# Patient Record
Sex: FEMALE | Race: WHITE | URBAN - METROPOLITAN AREA
[De-identification: names, ages, dates, MRNs, and addresses within clinical notes are randomized per-mention and may not be internally consistent; named-entity substitution may affect disease eponyms.]

---

## 2017-02-22 ENCOUNTER — OUTPATIENT (OUTPATIENT)
Dept: OUTPATIENT SERVICES | Facility: HOSPITAL | Age: 47
LOS: 1 days | Discharge: HOME | End: 2017-02-22

## 2017-06-27 DIAGNOSIS — Z12.31 ENCOUNTER FOR SCREENING MAMMOGRAM FOR MALIGNANT NEOPLASM OF BREAST: ICD-10-CM

## 2018-06-27 ENCOUNTER — OUTPATIENT (OUTPATIENT)
Dept: OUTPATIENT SERVICES | Facility: HOSPITAL | Age: 48
LOS: 1 days | Discharge: HOME | End: 2018-06-27

## 2018-06-27 DIAGNOSIS — Z12.31 ENCOUNTER FOR SCREENING MAMMOGRAM FOR MALIGNANT NEOPLASM OF BREAST: ICD-10-CM

## 2019-08-09 ENCOUNTER — OUTPATIENT (OUTPATIENT)
Dept: ADMINISTRATIVE | Facility: HOSPITAL | Age: 49
LOS: 1 days | Discharge: HOME | End: 2019-08-09
Payer: COMMERCIAL

## 2019-08-09 PROCEDURE — 77063 BREAST TOMOSYNTHESIS BI: CPT | Mod: 26

## 2019-08-09 PROCEDURE — 77067 SCR MAMMO BI INCL CAD: CPT | Mod: 26

## 2019-08-15 DIAGNOSIS — Z12.31 ENCOUNTER FOR SCREENING MAMMOGRAM FOR MALIGNANT NEOPLASM OF BREAST: ICD-10-CM

## 2019-08-23 ENCOUNTER — OUTPATIENT (OUTPATIENT)
Dept: OUTPATIENT SERVICES | Facility: HOSPITAL | Age: 49
LOS: 1 days | Discharge: HOME | End: 2019-08-23
Payer: COMMERCIAL

## 2019-08-23 DIAGNOSIS — R92.8 OTHER ABNORMAL AND INCONCLUSIVE FINDINGS ON DIAGNOSTIC IMAGING OF BREAST: ICD-10-CM

## 2019-08-23 PROCEDURE — 76642 ULTRASOUND BREAST LIMITED: CPT | Mod: 26,LT

## 2019-08-23 PROCEDURE — 77061 BREAST TOMOSYNTHESIS UNI: CPT | Mod: 26,LT

## 2019-08-23 PROCEDURE — 77065 DX MAMMO INCL CAD UNI: CPT | Mod: 26,LT

## 2019-08-23 PROCEDURE — G0279: CPT | Mod: 26,LT

## 2019-08-26 ENCOUNTER — OUTPATIENT (OUTPATIENT)
Dept: OUTPATIENT SERVICES | Facility: HOSPITAL | Age: 49
LOS: 1 days | Discharge: HOME | End: 2019-08-26
Payer: COMMERCIAL

## 2019-08-26 ENCOUNTER — RESULT REVIEW (OUTPATIENT)
Age: 49
End: 2019-08-26

## 2019-08-26 PROCEDURE — 88305 TISSUE EXAM BY PATHOLOGIST: CPT | Mod: 26

## 2019-08-26 PROCEDURE — 77065 DX MAMMO INCL CAD UNI: CPT | Mod: 26,LT

## 2019-08-26 PROCEDURE — 19083 BX BREAST 1ST LESION US IMAG: CPT | Mod: LT

## 2019-08-29 DIAGNOSIS — N63.20 UNSPECIFIED LUMP IN THE LEFT BREAST, UNSPECIFIED QUADRANT: ICD-10-CM

## 2019-08-29 DIAGNOSIS — D24.2 BENIGN NEOPLASM OF LEFT BREAST: ICD-10-CM

## 2019-08-29 DIAGNOSIS — R92.0 MAMMOGRAPHIC MICROCALCIFICATION FOUND ON DIAGNOSTIC IMAGING OF BREAST: ICD-10-CM

## 2019-10-03 PROBLEM — Z00.00 ENCOUNTER FOR PREVENTIVE HEALTH EXAMINATION: Status: ACTIVE | Noted: 2019-10-03

## 2019-10-10 ENCOUNTER — APPOINTMENT (OUTPATIENT)
Dept: BREAST CENTER | Facility: CLINIC | Age: 49
End: 2019-10-10
Payer: COMMERCIAL

## 2019-10-10 VITALS — HEIGHT: 66 IN | BODY MASS INDEX: 38.57 KG/M2 | WEIGHT: 240 LBS

## 2019-10-10 DIAGNOSIS — Z87.891 PERSONAL HISTORY OF NICOTINE DEPENDENCE: ICD-10-CM

## 2019-10-10 DIAGNOSIS — Z86.79 PERSONAL HISTORY OF OTHER DISEASES OF THE CIRCULATORY SYSTEM: ICD-10-CM

## 2019-10-10 DIAGNOSIS — Z78.9 OTHER SPECIFIED HEALTH STATUS: ICD-10-CM

## 2019-10-10 DIAGNOSIS — Z80.3 FAMILY HISTORY OF MALIGNANT NEOPLASM OF BREAST: ICD-10-CM

## 2019-10-10 PROCEDURE — 99203 OFFICE O/P NEW LOW 30 MIN: CPT

## 2019-10-10 RX ORDER — AZILSARTAN KAMEDOXOMIL AND CHLORTHALIDONE 40; 25 MG/1; MG/1
TABLET ORAL
Refills: 0 | Status: ACTIVE | COMMUNITY

## 2019-10-10 RX ORDER — ESTRADIOL 10 UG/1
TABLET, FILM COATED VAGINAL
Refills: 0 | Status: ACTIVE | COMMUNITY

## 2019-10-16 NOTE — ASSESSMENT
[FreeTextEntry1] : Tianna is a 48 F with a left breast retroareolar intraductal papilloma. \par \par On exam, there was no suspicious lesions palpated in either breast.  Her most recent screening mammogram on 8/9/19 revealed a left retroareolar mass which, on subsequent dx imaging revealed a oval circumscribed mass in the left, retroareolar area, measuring 0.7 x 0.7 x 0.5 mm.  This was biopsy proven intraductal papilloma without atypia. \par \par Intraductal papillomas without atypia are considered fibroproliferative lesions without atypia.  Patients with these lesions were found to have a slightly increased relative risk of breast cancer compared to the reference population.  However the lesions themselves do not have any malignant potential. \par \par Although newer studies regarding the upgrade rate (to cancer) ranges from 0-14% on surgical excision, with pathologic/radiologic concordance, older studies found a higher upgrade rate.  I have recommended surgical excision for this reason.  Because it is not readily palpable, I will have her undergo a preoperative radiofrequency localization.  \par \par The risks and benefits of the procedure were explained to the patient, including bleeding, infection, seroma/hematoma formation, and possible re-operation if the surgical excision yields an upgrade to cancer with positive margins.\par \par In regards to her family history of breast cancer, her risk assessment is as follows: \par \par RISK ASSESSMENT: \par Latonia\par 5yr -- 1.6%\par lifetime -- 14.4%\par \par TC v6\par 5yr -- 4.1%\par lifetime -- 16.5%\par \par This puts her at an intermediate risk for breast cancer.  She does not quite meet criteria for screening MRIs or chemoppx at this time.  IN regards to genetic testing, both her paternal aunt and grandmother were diagnosed at a young age.  If either of them were found to have a mutation, then we can test Tianna for that mutation.  This was discussed with her and she will let us know if she would like to proceed with genetic testing.  \par \par We discussed dense breasts.  Increasing breast density has been found to increase ones risk of breast cancer, but at this time, there is no clear indication for additional imaging in this setting, as both US and MRI have not been found to improve survival.  One can consider bilateral screening US.  However, out of 1000 women screened, the use of routine US will only identify an additional 3-5 cancers.  The use of US was found to increase the likelihood of undergoing more imaging and more biopsies.  She does have dense breasts.  We have decided to proceed with screening bilateral breast US at this time.  This will be scheduled with her next screening mammogram.\par \par All of her questions were answered.  She knows to call with any further questions or concerns. \par \par PLAN: \par -OR: L WLE WITH RF LOCALIZATION \par -DIAGNOSIS: LEFT INTRADUCTAL PAPILLOMA \par -possible genetic testing \par -f/up after

## 2019-10-16 NOTE — DATA REVIEWED
[FreeTextEntry1] : EXAM: MG MAMMO SCREEN W SEGUNDO BI# \par \par \par PROCEDURE DATE: 08/09/2019 \par \par \par \par INTERPRETATION: HISTORY: \par Bilateral MG MAMMO SCREEN W SEGUNDO BI# was performed. Patient is 48 years old \par and is seen for screening. The patient has no personal history of cancer. \par The patient has the following family history of breast cancer: paternal \par grandmother, at age 50, breast cancer and paternal aunt, breast cancer. \par \par RISK ASSESSMENT: \par NCI Lifetime Risk: 12.3% \par \par CLINICAL BREAST EXAM: \par The patient reports her last clinical breast exam was performed 6 months ago. \par \par COMPARISON STUDIES: \par The present examination has been compared to prior imaging studies performed \par at [facility] on 02/19/2016, 02/22/2017 and \par 06/27/2018. \par \par MAMMOGRAM FINDINGS: \par Mammography was performed including the following views: bilateral \par craniocaudal with tomosynthesis, bilateral mediolateral oblique with \par tomosynthesis. The examination includes digital synthetic 2D and digital \par tomosynthesis 3D images. Additional imaging analysis was performed using CAD \par (computer-aided detection) software. \par \par The breasts are heterogeneously dense, which may obscure small masses. \par \par There is an apparent mass with circumscribed margins seen in the \par retroareolar of the left breast. \par \par No suspicious mass, grouping of calcifications, or other abnormality is \par identified in the right breast. \par \par IMPRESSION: \par Mass in the left breast requires additional evaluation. \par \par RECOMMENDATION: \par Patient will be recalled for additional mammographic views and, if \par indicated, breast ultrasound. \par \par ASSESSMENT: \par BI-RADS Category 0: Incomplete: Needs Additional Imaging Evaluation \par \par Given the patient's history, she meets the [employer] Society \par guidelines for annual screening with breast MRI in addition to annual \par mammography (i.e., lifetime risk greater than 20-25%) \par \par The patient will be notified of these results by telephone, and will also be \par mailed a written summary in layman's terms. \par \par \par \par \par \par \par \par \par JULIETH DEWITT M.D., ATTENDING RADIOLOGIST \par This document has been electronically signed. Aug 9 2019 2:15PM \par \par \par \par \par EXAM: US BREAST LIMITED LT \par EXAM: MG MAMMO DIAG W SEGUNDO LT# \par \par \par PROCEDURE DATE: 08/23/2019 \par \par \par \par INTERPRETATION: Clinical History / Reason for exam: Call back for a mass in \par the left breast from screening mammogram. \par \par The patient reports her last clinical breast examination was performed 6 \par months ago. \par \par Family history: paternal grandmother, at age 50, breast cancer and paternal \par aunt, breast cancer \par \par Comparisons: 8/23/2019 mammogram. \par \par Views obtained:left digital 2D and digital tomosynthesis 3D images. \par \par Computer-aided detection was utilized in the interpretation of this \par examination. \par \par Breast composition:The breasts are heterogeneously dense, which may obscure \par small masses. \par \par Findings: \par \par Mammogram: \par There is an oval circumscribed mass measuring 0.8 cm in the retroareolar \par region of the left breast at the area of mammographic concern. No suspicious \par microcalcifications in the left breast. \par \par \par Ultrasound: \par \par Targeted unilateral left breast ultrasound was performed. \par \par At the retroareolar region of the left breast, area of mammographic concern \par there is an oval circumscribed hypoechoic mass measuring 0.7 x 0.7 x 0.5 cm. \par Ultrasound-guided biopsy recommended. \par \par Impression: An oval circumscribed hypoechoic mass at the retroareolar region \par of the left breast. Ultrasound-guided biopsy recommended. \par \par Recommendation: Ultrasound guided biopsy. \par \par BI-RADS Category 4: Suspicious \par \par \par The above findings and recommendations were discussed with the patient at \par the time of the examination. \par \par The above findings and recommendations were discussed with Dr. Lund by  \par Dennys at 12:02 PM via telephone. \par \par \par \par \par \par \par SANGEETA CAMPOS M.D., ATTENDING RADIOLOGIST \par This document has been electronically signed. Aug 23 2019 12:11PM \par EXAM: US BX BRST 1ST LT SISC \par \par *** ADDENDUM 08/27/2019 *** \par \par The findings and recommendations were discussed with Dr. Thomas on 8/27/2019 \par at 3:00 PM \par \par \par *** END OF ADDENDUM 08/27/2019 *** \par \par \par *** ADDENDUM 08/27/2019 *** \par \par Pathology addendum: \par \par Pathology: Intraductal papilloma \par \par High-risk pathology \par \par Recommendation: Surgical consultation. \par \par The results were discussed with the patient by telephone on 8/27/2019 at \par 1:45 PM \par \par \par *** END OF ADDENDUM 08/27/2019 *** \par \par \par \par PROCEDURE DATE: 08/26/2019 \par \par \par \par INTERPRETATION: CLINICAL HISTORY / REASON FOR EXAM: Indeterminate left \par breast retroareolar mass. \par \par Images were reviewed. Informed consent was obtained including a discussion \par of risks and benefits of the procedure as well as questioning about patient \par allergies. The patient safety checklist, including time out procedure, was \par used. \par \par The 7 mm mass in the retroareolar position was identified. Under sterile \par conditions and after local infiltration with 5 mL buffered 1% lidocaine a \par 14g spring loaded core biopsy needle was inserted. Under ultrasound guidance \par the needle was positioned adjacent to the mass and 4 core samples were \par obtained. The needle was removed. A marking clip ([employer] stop-light) was \par deployed under ultrasound guidance. Manual compression was applied to \par achieve hemostasis. \par \par The patient tolerated the procedure well and there was no immediate post \par procedure complication. The specimens were sent to the laboratory for \par analysis. \par \par A post procedure mammogram was obtained. \par \par \par IMPRESSION: \par \par Successful ultrasound guided core biopsy of the left breast. \par \par HISTOLOGY: Pending, to be reported in an addendum. \par \par ***Please see the addendum at the top of this report. It may contain \par additional important information or changes.**** \par \par \par IVETTE SHIN M.D., RESIDENT RADIOLOGIST \par This document has been electronically signed. \par LUIS ANTONIO OLIVERA M.D., ATTENDING RADIOLOGIST \par This document has been electronically signed. Aug 26 2019 10:53AM \par Addend: LUIS ANTONIO OLIVERA M.D., ATTENDING RADIOLOGIST \par This addendum was electronically signed on: Aug 27 2019 1:48PM. \par Second Addend: LUIS ANTONIO OLIVERA M.D., ATTENDING RADIOLOGIST \par The second addendum was electronically signed on: Aug 27 2019 2:58PM.

## 2019-10-16 NOTE — CONSULT LETTER
[Dear  ___] : Dear  [unfilled], [Consult Letter:] : I had the pleasure of evaluating your patient, [unfilled]. [Please see my note below.] : Please see my note below. [Consult Closing:] : Thank you very much for allowing me to participate in the care of this patient.  If you have any questions, please do not hesitate to contact me. [Sincerely,] : Sincerely, [FreeTextEntry2] : Pramod Thomas MD\par [address] [place] [address]\[place] [address]\par  [FreeTextEntry3] : Jane Amin MD \par Breast Surgical Oncologist\par [facility]\par [facility]\par [facility]\par  [DrGregg  ___] : Dr. GUZMAN

## 2019-10-16 NOTE — REVIEW OF SYSTEMS
[As Noted in HPI] : as noted in HPI [Negative] : Heme/Lymph [Fever] : no fever [Chills] : no chills [Abn Vaginal Bleeding] : no unexplained vaginal bleeding [Skin Lesions] : no skin lesions [Skin Wound] : no skin wound [Breast Pain] : no breast pain [Breast Lump] : no breast lump [Hot Flashes] : no hot flashes [de-identified] : mood swings, currently on HRT

## 2019-10-16 NOTE — HISTORY OF PRESENT ILLNESS
[FreeTextEntry1] : Tianna is a 48 F with a screen detected left breast intraductal papilloma. \par \par Her work up was as follows: \par 19 -- b/l screening tomosynthesis \par -heterogenously dense breasts \par -apparent mass with circumscribe margins in retroareolar L breast \par -no suspicious mass, calcifications or other abnormality in R breast \par BIRADS 0 \par \par 19 -- L dx mammogram and US \par -heterogenously dense breasts \par -oval circumscribed mass in RA L, measuring 0.8 cm \par L US \par -RA, oval circumscribed hypoechoic mass, measuring 0.7 x 0.7 x 0.5 cm\par BIRADS 4 \par \par 19 -- L US CNBx \par -intraductal papilloma \par \par She has no breast related complaints at this time.  She denies any breast pain, has not palpated any new palpable masses in either breast and denies any nipple discharge or retraction.  \par \par HISTORICAL RISK FACTORS:\par -1 prior breast biopsy as above \par -family history of breast cancer in her paternal grandmother and aunt, at ages 50 and 48 respectively, unknown if they got genetic testing \par -, age at first live birth was 33 \par -prior OCP use x 15 years, stopped in ; currently on HRT x 1 year \par -s/p partial hysterectomy in  for uncontrolled vaginal bleeding \par \par RISK ASSESSMENT: \par Latonia\par 5yr -- 1.6%\par lifetime -- 14.4%\par \par TC v6\par 5yr -- 4.1%\par lifetime -- 16.5%

## 2019-10-16 NOTE — PHYSICAL EXAM
[Normocephalic] : normocephalic [EOMI] : extra ocular movement intact [Atraumatic] : atraumatic [No Cervical Adenopathy] : no cervical adenopathy [No Supraclavicular Adenopathy] : no supraclavicular adenopathy [Symmetrical] : symmetrical [Examined in the supine and seated position] : examined in the supine and seated position [No dominant masses] : no dominant masses left breast [No dominant masses] : no dominant masses in right breast  [No Nipple Retraction] : no left nipple retraction [No Nipple Discharge] : no left nipple discharge [No Axillary Lymphadenopathy] : no left axillary lymphadenopathy [Soft] : abdomen soft [Not Tender] : non-tender [No Rashes] : no rashes [No Edema] : no edema [No Ulceration] : no ulceration [de-identified] : no suspicious masses palpated in either breast  [de-identified] : her left breast papilloma was not readily palpable

## 2019-10-16 NOTE — PAST MEDICAL HISTORY
[Menarche Age ____] : age at menarche was [unfilled] [Menopause Age____] : age at menopause was [unfilled] [Total Preg ___] : G[unfilled] [History of Hormone Replacement Treatment] : has a history of hormone replacement treatment [Live Births ___] : P[unfilled]  [Age At Live Birth ___] : Age at live birth: [unfilled] [FreeTextEntry5] : s/p partial hysterectomy on 2012 [FreeTextEntry6] : denies [FreeTextEntry7] : yes in past x 15 years, stopped in 2003  [FreeTextEntry8] : denies

## 2019-11-08 ENCOUNTER — OUTPATIENT (OUTPATIENT)
Dept: OUTPATIENT SERVICES | Facility: HOSPITAL | Age: 49
LOS: 1 days | Discharge: HOME | End: 2019-11-08
Payer: COMMERCIAL

## 2019-11-08 VITALS
HEART RATE: 72 BPM | RESPIRATION RATE: 16 BRPM | TEMPERATURE: 98 F | HEIGHT: 66 IN | OXYGEN SATURATION: 98 % | DIASTOLIC BLOOD PRESSURE: 70 MMHG | SYSTOLIC BLOOD PRESSURE: 110 MMHG | WEIGHT: 240.08 LBS

## 2019-11-08 DIAGNOSIS — D24.2 BENIGN NEOPLASM OF LEFT BREAST: ICD-10-CM

## 2019-11-08 DIAGNOSIS — Z01.818 ENCOUNTER FOR OTHER PREPROCEDURAL EXAMINATION: ICD-10-CM

## 2019-11-08 DIAGNOSIS — Z90.711 ACQUIRED ABSENCE OF UTERUS WITH REMAINING CERVICAL STUMP: Chronic | ICD-10-CM

## 2019-11-08 LAB
ALBUMIN SERPL ELPH-MCNC: 4.3 G/DL — SIGNIFICANT CHANGE UP (ref 3.5–5.2)
ALP SERPL-CCNC: 105 U/L — SIGNIFICANT CHANGE UP (ref 30–115)
ALT FLD-CCNC: 19 U/L — SIGNIFICANT CHANGE UP (ref 0–41)
ANION GAP SERPL CALC-SCNC: 16 MMOL/L — HIGH (ref 7–14)
APPEARANCE UR: CLEAR — SIGNIFICANT CHANGE UP
APTT BLD: 32.9 SEC — SIGNIFICANT CHANGE UP (ref 27–39.2)
AST SERPL-CCNC: 19 U/L — SIGNIFICANT CHANGE UP (ref 0–41)
BASOPHILS # BLD AUTO: 0.04 K/UL — SIGNIFICANT CHANGE UP (ref 0–0.2)
BASOPHILS NFR BLD AUTO: 0.5 % — SIGNIFICANT CHANGE UP (ref 0–1)
BILIRUB SERPL-MCNC: 0.5 MG/DL — SIGNIFICANT CHANGE UP (ref 0.2–1.2)
BILIRUB UR-MCNC: NEGATIVE — SIGNIFICANT CHANGE UP
BUN SERPL-MCNC: 15 MG/DL — SIGNIFICANT CHANGE UP (ref 10–20)
CALCIUM SERPL-MCNC: 9.2 MG/DL — SIGNIFICANT CHANGE UP (ref 8.5–10.1)
CHLORIDE SERPL-SCNC: 98 MMOL/L — SIGNIFICANT CHANGE UP (ref 98–110)
CO2 SERPL-SCNC: 26 MMOL/L — SIGNIFICANT CHANGE UP (ref 17–32)
COLOR SPEC: SIGNIFICANT CHANGE UP
CREAT SERPL-MCNC: 0.8 MG/DL — SIGNIFICANT CHANGE UP (ref 0.7–1.5)
DIFF PNL FLD: NEGATIVE — SIGNIFICANT CHANGE UP
EOSINOPHIL # BLD AUTO: 0.1 K/UL — SIGNIFICANT CHANGE UP (ref 0–0.7)
EOSINOPHIL NFR BLD AUTO: 1.3 % — SIGNIFICANT CHANGE UP (ref 0–8)
GLUCOSE SERPL-MCNC: 91 MG/DL — SIGNIFICANT CHANGE UP (ref 70–99)
GLUCOSE UR QL: NEGATIVE — SIGNIFICANT CHANGE UP
HCT VFR BLD CALC: 39.4 % — SIGNIFICANT CHANGE UP (ref 37–47)
HGB BLD-MCNC: 13.9 G/DL — SIGNIFICANT CHANGE UP (ref 12–16)
IMM GRANULOCYTES NFR BLD AUTO: 0.3 % — SIGNIFICANT CHANGE UP (ref 0.1–0.3)
INR BLD: 1 RATIO — SIGNIFICANT CHANGE UP (ref 0.65–1.3)
KETONES UR-MCNC: NEGATIVE — SIGNIFICANT CHANGE UP
LEUKOCYTE ESTERASE UR-ACNC: NEGATIVE — SIGNIFICANT CHANGE UP
LYMPHOCYTES # BLD AUTO: 1.94 K/UL — SIGNIFICANT CHANGE UP (ref 1.2–3.4)
LYMPHOCYTES # BLD AUTO: 26.1 % — SIGNIFICANT CHANGE UP (ref 20.5–51.1)
MCHC RBC-ENTMCNC: 32.9 PG — HIGH (ref 27–31)
MCHC RBC-ENTMCNC: 35.3 G/DL — SIGNIFICANT CHANGE UP (ref 32–37)
MCV RBC AUTO: 93.1 FL — SIGNIFICANT CHANGE UP (ref 81–99)
MONOCYTES # BLD AUTO: 0.44 K/UL — SIGNIFICANT CHANGE UP (ref 0.1–0.6)
MONOCYTES NFR BLD AUTO: 5.9 % — SIGNIFICANT CHANGE UP (ref 1.7–9.3)
NEUTROPHILS # BLD AUTO: 4.9 K/UL — SIGNIFICANT CHANGE UP (ref 1.4–6.5)
NEUTROPHILS NFR BLD AUTO: 65.9 % — SIGNIFICANT CHANGE UP (ref 42.2–75.2)
NITRITE UR-MCNC: NEGATIVE — SIGNIFICANT CHANGE UP
NRBC # BLD: 0 /100 WBCS — SIGNIFICANT CHANGE UP (ref 0–0)
PH UR: 7 — SIGNIFICANT CHANGE UP (ref 5–8)
PLATELET # BLD AUTO: 302 K/UL — SIGNIFICANT CHANGE UP (ref 130–400)
POTASSIUM SERPL-MCNC: 4 MMOL/L — SIGNIFICANT CHANGE UP (ref 3.5–5)
POTASSIUM SERPL-SCNC: 4 MMOL/L — SIGNIFICANT CHANGE UP (ref 3.5–5)
PROT SERPL-MCNC: 6.7 G/DL — SIGNIFICANT CHANGE UP (ref 6–8)
PROT UR-MCNC: NEGATIVE — SIGNIFICANT CHANGE UP
PROTHROM AB SERPL-ACNC: 11.5 SEC — SIGNIFICANT CHANGE UP (ref 9.95–12.87)
RBC # BLD: 4.23 M/UL — SIGNIFICANT CHANGE UP (ref 4.2–5.4)
RBC # FLD: 11.9 % — SIGNIFICANT CHANGE UP (ref 11.5–14.5)
SODIUM SERPL-SCNC: 140 MMOL/L — SIGNIFICANT CHANGE UP (ref 135–146)
SP GR SPEC: 1.02 — SIGNIFICANT CHANGE UP (ref 1.01–1.02)
UROBILINOGEN FLD QL: SIGNIFICANT CHANGE UP
WBC # BLD: 7.44 K/UL — SIGNIFICANT CHANGE UP (ref 4.8–10.8)
WBC # FLD AUTO: 7.44 K/UL — SIGNIFICANT CHANGE UP (ref 4.8–10.8)

## 2019-11-08 PROCEDURE — 93010 ELECTROCARDIOGRAM REPORT: CPT

## 2019-11-08 NOTE — H&P PST ADULT - HISTORY OF PRESENT ILLNESS
49 Y/O FEMALE PT TO PAST WITH HX LEFT BREAST LUMP PT NOW FOR SCHEDULED PROCEDURE. PT DENIES ANY CP SOB PALP COUGH DYSURIA FEVER URI. PT ABLE TO ANDRZEJ 1-2 FOS W/O SOB

## 2019-11-08 NOTE — H&P PST ADULT - NSICDXFAMILYHX_GEN_ALL_CORE_FT
FAMILY HISTORY:  Family history of CKD (chronic kidney disease), FATHER RENAL FAILURE  Family history of CVA, FATHER  FH: breast cancer, AUNT, GRANDMOTHER  FH: CAD (coronary artery disease), FATHER

## 2019-11-08 NOTE — H&P PST ADULT - NSANTHOSAYNRD_GEN_A_CORE
-- Message is from the [facility] Contact Center--    Reason for Call: ***    Caller Information       Type Contact Phone    03/07/2019 06:03 PM Phone (Incoming) Cyn Tony (Self) [phone] (H)          Alternative phone number: ***    {Message Turnaround:[id number]}     No. CHON screening performed.  STOP BANG Legend: 0-2 = LOW Risk; 3-4 = INTERMEDIATE Risk; 5-8 = HIGH Risk

## 2019-11-17 ENCOUNTER — FORM ENCOUNTER (OUTPATIENT)
Age: 49
End: 2019-11-17

## 2019-11-18 ENCOUNTER — OUTPATIENT (OUTPATIENT)
Dept: OUTPATIENT SERVICES | Facility: HOSPITAL | Age: 49
LOS: 1 days | Discharge: HOME | End: 2019-11-18
Payer: COMMERCIAL

## 2019-11-18 DIAGNOSIS — Z90.711 ACQUIRED ABSENCE OF UTERUS WITH REMAINING CERVICAL STUMP: Chronic | ICD-10-CM

## 2019-11-18 PROBLEM — I10 ESSENTIAL (PRIMARY) HYPERTENSION: Chronic | Status: ACTIVE | Noted: 2019-11-08

## 2019-11-18 PROCEDURE — 19281 PERQ DEVICE BREAST 1ST IMAG: CPT | Mod: LT

## 2019-11-19 ENCOUNTER — OUTPATIENT (OUTPATIENT)
Dept: OUTPATIENT SERVICES | Facility: HOSPITAL | Age: 49
LOS: 1 days | Discharge: HOME | End: 2019-11-19
Payer: COMMERCIAL

## 2019-11-19 ENCOUNTER — APPOINTMENT (OUTPATIENT)
Dept: BREAST CENTER | Facility: AMBULATORY SURGERY CENTER | Age: 49
End: 2019-11-19
Payer: COMMERCIAL

## 2019-11-19 ENCOUNTER — RESULT REVIEW (OUTPATIENT)
Age: 49
End: 2019-11-19

## 2019-11-19 VITALS
SYSTOLIC BLOOD PRESSURE: 102 MMHG | HEIGHT: 66 IN | WEIGHT: 240.08 LBS | OXYGEN SATURATION: 96 % | RESPIRATION RATE: 20 BRPM | HEART RATE: 778 BPM | TEMPERATURE: 98 F | DIASTOLIC BLOOD PRESSURE: 58 MMHG

## 2019-11-19 VITALS
HEART RATE: 61 BPM | RESPIRATION RATE: 15 BRPM | SYSTOLIC BLOOD PRESSURE: 112 MMHG | DIASTOLIC BLOOD PRESSURE: 57 MMHG | OXYGEN SATURATION: 97 %

## 2019-11-19 DIAGNOSIS — Z90.711 ACQUIRED ABSENCE OF UTERUS WITH REMAINING CERVICAL STUMP: Chronic | ICD-10-CM

## 2019-11-19 PROCEDURE — 19125 EXCISION BREAST LESION: CPT | Mod: LT

## 2019-11-19 PROCEDURE — 88305 TISSUE EXAM BY PATHOLOGIST: CPT | Mod: 26

## 2019-11-19 RX ORDER — SODIUM CHLORIDE 9 MG/ML
1000 INJECTION, SOLUTION INTRAVENOUS
Refills: 0 | Status: DISCONTINUED | OUTPATIENT
Start: 2019-11-19 | End: 2019-12-17

## 2019-11-19 RX ORDER — IBUPROFEN 600 MG/1
600 TABLET, FILM COATED ORAL EVERY 8 HOURS
Qty: 12 | Refills: 0 | Status: ACTIVE | COMMUNITY
Start: 2019-11-19 | End: 1900-01-01

## 2019-11-19 RX ORDER — AZILSARTAN KAMEDOXOMIL AND CHLORTHALIDONE 40; 12.5 MG/1; MG/1
1 TABLET ORAL
Qty: 0 | Refills: 0 | DISCHARGE

## 2019-11-19 RX ORDER — ONDANSETRON 8 MG/1
4 TABLET, FILM COATED ORAL ONCE
Refills: 0 | Status: DISCONTINUED | OUTPATIENT
Start: 2019-11-19 | End: 2019-12-17

## 2019-11-19 RX ORDER — OXYCODONE AND ACETAMINOPHEN 5; 325 MG/1; MG/1
1 TABLET ORAL EVERY 4 HOURS
Refills: 0 | Status: DISCONTINUED | OUTPATIENT
Start: 2019-11-19 | End: 2019-11-19

## 2019-11-19 RX ORDER — HYDROMORPHONE HYDROCHLORIDE 2 MG/ML
0.5 INJECTION INTRAMUSCULAR; INTRAVENOUS; SUBCUTANEOUS
Refills: 0 | Status: DISCONTINUED | OUTPATIENT
Start: 2019-11-19 | End: 2019-11-19

## 2019-11-19 RX ADMIN — SODIUM CHLORIDE 100 MILLILITER(S): 9 INJECTION, SOLUTION INTRAVENOUS at 08:40

## 2019-11-19 NOTE — ASU DISCHARGE PLAN (ADULT/PEDIATRIC) - CARE PROVIDER_API CALL
Jane Amin (MD)  Surgery  [address] [facility], [address] [place] [address]  Phone: [phone]  Fax: [phone]  Follow Up Time:

## 2019-11-19 NOTE — PRE-ANESTHESIA EVALUATION ADULT - NSANTHOSAYNRD_GEN_A_CORE
No. CHON screening performed.  STOP BANG Legend: 0-2 = LOW Risk; 3-4 = INTERMEDIATE Risk; 5-8 = HIGH Risk

## 2019-11-19 NOTE — PRE-ANESTHESIA EVALUATION ADULT - NSANTHADDINFOFT_GEN_ALL_CORE
MAC vs general. discussed with th epatient all the risks, benefits, alternatives, complications. all questions answered. willing to proceed

## 2019-11-19 NOTE — ASU DISCHARGE PLAN (ADULT/PEDIATRIC) - PATIENT EDUCATION MATERIALS PROVIED
Pre-printed instructions given for other (specify)/pain management/Provider pre-printed instructions given

## 2019-11-21 LAB — SURGICAL PATHOLOGY STUDY: SIGNIFICANT CHANGE UP

## 2019-11-22 DIAGNOSIS — R92.8 OTHER ABNORMAL AND INCONCLUSIVE FINDINGS ON DIAGNOSTIC IMAGING OF BREAST: ICD-10-CM

## 2019-11-24 DIAGNOSIS — N60.21 FIBROADENOSIS OF RIGHT BREAST: ICD-10-CM

## 2019-11-24 DIAGNOSIS — N60.82 OTHER BENIGN MAMMARY DYSPLASIAS OF LEFT BREAST: ICD-10-CM

## 2019-11-24 DIAGNOSIS — D24.2 BENIGN NEOPLASM OF LEFT BREAST: ICD-10-CM

## 2019-11-24 DIAGNOSIS — N60.22 FIBROADENOSIS OF LEFT BREAST: ICD-10-CM

## 2019-11-27 ENCOUNTER — APPOINTMENT (OUTPATIENT)
Dept: BREAST CENTER | Facility: CLINIC | Age: 49
End: 2019-11-27
Payer: COMMERCIAL

## 2019-11-27 VITALS
BODY MASS INDEX: 38.57 KG/M2 | WEIGHT: 240 LBS | SYSTOLIC BLOOD PRESSURE: 130 MMHG | DIASTOLIC BLOOD PRESSURE: 80 MMHG | HEIGHT: 66 IN | TEMPERATURE: 98 F

## 2019-11-27 PROCEDURE — 99024 POSTOP FOLLOW-UP VISIT: CPT

## 2019-11-27 NOTE — ASSESSMENT
[FreeTextEntry1] : Tianna is a 48 F with a left breast retroareolar intraductal papilloma, s/p L WLE on 11/19/19.\par \par 11/19/19 -- L WLE \par -no residual intraductal papilloma, benign breast tissue with proliferative type FC changes\par \par She is healing well from her surgery.  There was no evidence of any seroma, infection, hematoma or ecchymoses at her surgical site.  SHe will be due for a L dx mammogram and US on 5/19/2020 to obtain a new baseline.  I will have her follow up after for a CBE. \par \par Her final pathology did not reveal any additional atypia or breast cancer.  No further surgical intervention is indicated at this time.  \par \par Her next screening mammogram will be due on 8/9/2020.\par \par Intraductal papillomas without atypia are considered fibroproliferative lesions without atypia.  Patients with these lesions were found to have a slightly increased relative risk of breast cancer compared to the reference population.  However the lesions themselves do not have any malignant potential. \par \par In regards to her family history of breast cancer, her risk assessment is as follows: \par \par RISK ASSESSMENT: \par Latonia\par 5yr -- 1.6%\par lifetime -- 14.4%\par \par TC v6\par 5yr -- 4.1%\par lifetime -- 16.5%\par \par This puts her at an intermediate risk for breast cancer.  She does not quite meet criteria for screening MRIs or chemoppx at this time.  IN regards to genetic testing, both her paternal aunt and grandmother were diagnosed at a young age.  If either of them were found to have a mutation, then we can test Tianna for that mutation.  This was discussed with her and she will let us know if she would like to proceed with genetic testing.  \par \par We discussed dense breasts.  Increasing breast density has been found to increase ones risk of breast cancer, but at this time, there is no clear indication for additional imaging in this setting, as both US and MRI have not been found to improve survival.  One can consider bilateral screening US.  However, out of 1000 women screened, the use of routine US will only identify an additional 3-5 cancers.  The use of US was found to increase the likelihood of undergoing more imaging and more biopsies.  She does have dense breasts.  We have decided to proceed with screening bilateral breast US at this time.  This will be scheduled with her next screening mammogram.\par \par All of her questions were answered.  She knows to call with any further questions or concerns. \par \par PLAN: \par -L dx mammogram and US On 5/19/2020\par -f/up after repeat imaging

## 2019-11-27 NOTE — PAST MEDICAL HISTORY
[Menarche Age ____] : age at menarche was [unfilled] [Menopause Age____] : age at menopause was [unfilled] [History of Hormone Replacement Treatment] : has a history of hormone replacement treatment [Total Preg ___] : G[unfilled] [Live Births ___] : P[unfilled]  [Age At Live Birth ___] : Age at live birth: [unfilled] [FreeTextEntry5] : s/p partial hysterectomy on 2012 [FreeTextEntry6] : denies [FreeTextEntry7] : yes in past x 15 years, stopped in 2003  [FreeTextEntry8] : denies

## 2019-11-27 NOTE — REVIEW OF SYSTEMS
[As Noted in HPI] : as noted in HPI [Negative] : Heme/Lymph [Fever] : no fever [Chills] : no chills [Abn Vaginal Bleeding] : no unexplained vaginal bleeding [Skin Lesions] : no skin lesions [Skin Wound] : skin wound [Breast Pain] : no breast pain [Breast Lump] : no breast lump [Hot Flashes] : no hot flashes [de-identified] : mood swings, currently on HRT

## 2019-11-27 NOTE — PHYSICAL EXAM
[Normocephalic] : normocephalic [Atraumatic] : atraumatic [EOMI] : extra ocular movement intact [No Supraclavicular Adenopathy] : no supraclavicular adenopathy [No Cervical Adenopathy] : no cervical adenopathy [Examined in the supine and seated position] : examined in the supine and seated position [Symmetrical] : symmetrical [No dominant masses] : no dominant masses in right breast  [No dominant masses] : no dominant masses left breast [No Nipple Retraction] : no left nipple retraction [No Nipple Discharge] : no left nipple discharge [No Axillary Lymphadenopathy] : no left axillary lymphadenopathy [Soft] : abdomen soft [Not Tender] : non-tender [No Edema] : no edema [No Rashes] : no rashes [No Ulceration] : no ulceration [de-identified] : no suspicious masses palpated in either breast  [de-identified] : surgical incision is healing well without any induration or erythema

## 2019-11-27 NOTE — HISTORY OF PRESENT ILLNESS
[FreeTextEntry1] : Tianna is a 48 F with a screen detected left breast intraductal papilloma s/p L WLE on 19.\par \par 19 -- L WLE \par -benign retroareolar breast tissue with proliferative type fibrocystic changes, healing prior biopsy site changes with no residual intraductal papilloma identified\par \par Her work up was as follows: \par 19 -- b/l screening tomosynthesis \par -heterogenously dense breasts \par -apparent mass with circumscribe margins in retroareolar L breast \par -no suspicious mass, calcifications or other abnormality in R breast \par BIRADS 0 \par \par 19 -- L dx mammogram and US \par -heterogenously dense breasts \par -oval circumscribed mass in RA L, measuring 0.8 cm \par L US \par -RA, oval circumscribed hypoechoic mass, measuring 0.7 x 0.7 x 0.5 cm\par BIRADS 4 \par \par 19 -- L US CNBx \par -intraductal papilloma \par \par She has no breast related complaints at this time.  She denies any breast pain, has not palpated any new palpable masses in either breast and denies any nipple discharge or retraction.  \par \par HISTORICAL RISK FACTORS:\par -1 prior breast biopsy as above \par -family history of breast cancer in her paternal grandmother and aunt, at ages 50 and 48 respectively, unknown if they got genetic testing \par -, age at first live birth was 33 \par -prior OCP use x 15 years, stopped in ; currently on HRT x 1 year \par -s/p partial hysterectomy in  for uncontrolled vaginal bleeding \par \par RISK ASSESSMENT: \par Latonia\par 5yr -- 1.6%\par lifetime -- 14.4%\par \par TC v6\par 5yr -- 4.1%\par lifetime -- 16.5%\par \par INTERVAL HISTORY: \par Tianna is a 48 F with a left breast intraductal papilloma, s/p L WLE on 19. \par She is healing well from her surgery.  She denies any pain in the area, no drainage, no fevers or chills.  \par \par Her final pathology revealed no residual papilloma and benign breast tissue.

## 2020-06-19 ENCOUNTER — RESULT REVIEW (OUTPATIENT)
Age: 50
End: 2020-06-19

## 2020-06-19 ENCOUNTER — OUTPATIENT (OUTPATIENT)
Dept: OUTPATIENT SERVICES | Facility: HOSPITAL | Age: 50
LOS: 1 days | Discharge: HOME | End: 2020-06-19
Payer: COMMERCIAL

## 2020-06-19 DIAGNOSIS — Z90.711 ACQUIRED ABSENCE OF UTERUS WITH REMAINING CERVICAL STUMP: Chronic | ICD-10-CM

## 2020-06-19 DIAGNOSIS — R92.8 OTHER ABNORMAL AND INCONCLUSIVE FINDINGS ON DIAGNOSTIC IMAGING OF BREAST: ICD-10-CM

## 2020-06-19 PROCEDURE — 77065 DX MAMMO INCL CAD UNI: CPT | Mod: 26,LT

## 2020-06-19 PROCEDURE — 76642 ULTRASOUND BREAST LIMITED: CPT | Mod: 26,LT

## 2020-06-19 PROCEDURE — G0279: CPT | Mod: 26

## 2020-06-30 ENCOUNTER — RESULT REVIEW (OUTPATIENT)
Age: 50
End: 2020-06-30

## 2020-06-30 ENCOUNTER — OUTPATIENT (OUTPATIENT)
Dept: OUTPATIENT SERVICES | Facility: HOSPITAL | Age: 50
LOS: 1 days | Discharge: HOME | End: 2020-06-30
Payer: COMMERCIAL

## 2020-06-30 DIAGNOSIS — Z90.711 ACQUIRED ABSENCE OF UTERUS WITH REMAINING CERVICAL STUMP: Chronic | ICD-10-CM

## 2020-06-30 PROCEDURE — 88313 SPECIAL STAINS GROUP 2: CPT | Mod: 26

## 2020-06-30 PROCEDURE — 19081 BX BREAST 1ST LESION STRTCTC: CPT | Mod: LT

## 2020-06-30 PROCEDURE — 88342 IMHCHEM/IMCYTCHM 1ST ANTB: CPT | Mod: 26

## 2020-06-30 PROCEDURE — 88305 TISSUE EXAM BY PATHOLOGIST: CPT | Mod: 26

## 2020-06-30 PROCEDURE — 88341 IMHCHEM/IMCYTCHM EA ADD ANTB: CPT | Mod: 26

## 2020-07-02 LAB — SURGICAL PATHOLOGY STUDY: SIGNIFICANT CHANGE UP

## 2020-07-08 DIAGNOSIS — R92.1 MAMMOGRAPHIC CALCIFICATION FOUND ON DIAGNOSTIC IMAGING OF BREAST: ICD-10-CM

## 2020-07-08 DIAGNOSIS — N60.22 FIBROADENOSIS OF LEFT BREAST: ICD-10-CM

## 2020-07-08 DIAGNOSIS — N60.82 OTHER BENIGN MAMMARY DYSPLASIAS OF LEFT BREAST: ICD-10-CM

## 2020-07-08 DIAGNOSIS — N60.32 FIBROSCLEROSIS OF LEFT BREAST: ICD-10-CM

## 2020-07-08 DIAGNOSIS — R92.0 MAMMOGRAPHIC MICROCALCIFICATION FOUND ON DIAGNOSTIC IMAGING OF BREAST: ICD-10-CM

## 2020-07-17 ENCOUNTER — APPOINTMENT (OUTPATIENT)
Dept: BREAST CENTER | Facility: CLINIC | Age: 50
End: 2020-07-17
Payer: COMMERCIAL

## 2020-07-17 VITALS
BODY MASS INDEX: 38.57 KG/M2 | HEIGHT: 66 IN | DIASTOLIC BLOOD PRESSURE: 80 MMHG | SYSTOLIC BLOOD PRESSURE: 128 MMHG | WEIGHT: 240 LBS | TEMPERATURE: 97.1 F

## 2020-07-17 PROCEDURE — 99213 OFFICE O/P EST LOW 20 MIN: CPT

## 2020-07-20 NOTE — PHYSICAL EXAM
[Normocephalic] : normocephalic [EOMI] : extra ocular movement intact [No Supraclavicular Adenopathy] : no supraclavicular adenopathy [Atraumatic] : atraumatic [Examined in the supine and seated position] : examined in the supine and seated position [No Cervical Adenopathy] : no cervical adenopathy [No dominant masses] : no dominant masses in right breast  [Symmetrical] : symmetrical [No dominant masses] : no dominant masses left breast [No Nipple Retraction] : no left nipple retraction [No Nipple Discharge] : no left nipple discharge [No Axillary Lymphadenopathy] : no right axillary lymphadenopathy [Soft] : abdomen soft [No Edema] : no edema [Not Tender] : non-tender [No Ulceration] : no ulceration [No Rashes] : no rashes [de-identified] : surgical incision is well healed  [de-identified] : no suspicious masses palpated in either breast

## 2020-07-20 NOTE — REVIEW OF SYSTEMS
[As Noted in HPI] : as noted in HPI [Negative] : Heme/Lymph [Abn Vaginal Bleeding] : no unexplained vaginal bleeding [Fever] : no fever [Chills] : no chills [Skin Lesions] : no skin lesions [Skin Wound] : no skin wound [Breast Pain] : no breast pain [Hot Flashes] : no hot flashes [Breast Lump] : no breast lump [de-identified] : mood swings, currently on HRT

## 2020-07-20 NOTE — HISTORY OF PRESENT ILLNESS
[FreeTextEntry1] : Tianna is a 48 F with a screen detected left breast intraductal papilloma s/p L WLE on 19, now with a left breast lesion suggestive of a radial scar. \par \par 19 -- L WLE \par -benign retroareolar breast tissue with proliferative type fibrocystic changes, healing prior biopsy site changes with no residual intraductal papilloma identified\par \par Her work up was as follows: \par 19 -- b/l screening tomosynthesis \par -heterogenously dense breasts \par -apparent mass with circumscribe margins in retroareolar L breast \par -no suspicious mass, calcifications or other abnormality in R breast \par BIRADS 0 \par \par 19 -- L dx mammogram and US \par -heterogenously dense breasts \par -oval circumscribed mass in RA L, measuring 0.8 cm \par L US \par -RA, oval circumscribed hypoechoic mass, measuring 0.7 x 0.7 x 0.5 cm\par BIRADS 4 \par \par 19 -- L US CNBx \par -intraductal papilloma \par \par She has no breast related complaints at this time.  She denies any breast pain, has not palpated any new palpable masses in either breast and denies any nipple discharge or retraction.  \par \par HISTORICAL RISK FACTORS:\par -1 prior breast biopsy as above \par -family history of breast cancer in her paternal grandmother and aunt, at ages 50 and 48 respectively, unknown if they got genetic testing \par -, age at first live birth was 33 \par -prior OCP use x 15 years, stopped in ; currently on HRT x 1 year \par -s/p partial hysterectomy in  for uncontrolled vaginal bleeding \par \par RISK ASSESSMENT: \par Latonia\par 5yr -- 1.6%\par lifetime -- 14.4%\par \par TC v6\par 5yr -- 4.1%\par lifetime -- 16.5%\par \par INTERVAL HISTORY: \par Tianna is a 48 F with a left breast intraductal papilloma, s/p L WLE on 19. \par She returns today for a left breast biopsy revealing a lesion suggestive a radial scar. \par \par Her work up was as follows: \par 2020 --L dx mammogram \par -heterogenously dense breasts\par -post surgical distortion in lateral breast \par -in medial breast, circumscribed hypodense mass measuring 0.8 cm --> BIRADS 3 \par -new heterogenous calcifications in UOQ, L --> BIOPSY \par BIRADS 3 \par \par 7/3/2020 -- L stereotactic guided biopsy \par -small stellate sclerosing lesion, 2 mm, at the edge of core biopsy, calcifications suggestive of but not diagnostically definitive for a tiny radial scar \par \par She has no breast related complaints at this time.  She denies any breast pain, has not palpated any new palpable masses in either breast and denies any nipple discharge or retraction.

## 2020-07-20 NOTE — PAST MEDICAL HISTORY
[Menarche Age ____] : age at menarche was [unfilled] [Menopause Age____] : age at menopause was [unfilled] [History of Hormone Replacement Treatment] : has a history of hormone replacement treatment [Total Preg ___] : G[unfilled] [Live Births ___] : P[unfilled]  [Age At Live Birth ___] : Age at live birth: [unfilled] [FreeTextEntry5] : s/p partial hysterectomy on 2012 [FreeTextEntry8] : denies  [FreeTextEntry6] : denies [FreeTextEntry7] : yes in past x 15 years, stopped in 2003

## 2020-07-20 NOTE — DATA REVIEWED
[FreeTextEntry1] : EXAM: US BREAST LIMITED LT\par EXAM: MG MAMMO DIAG W SEGUNDO LT#\par \par \par PROCEDURE DATE: 06/19/2020\par \par \par \par INTERPRETATION: Clinical History / Reason for exam: Follow-up surgical\par excision of left breast intraductal papilloma.\par \par The patient reports her last clinical breast examination was performed about\par 8 month ago.\par \par Family history: paternal grandmother, at age 50, breast cancer and paternal\par aunt, breast cancer.\par \par Comparisons: Mammograms dating back 2017.\par \par Views obtained: left full field digital 2D and digital tomosynthesis images\par as well as spot views.\par \par Computer-aided detection was utilized in the interpretation of this\par examination.\par \par Breast composition: The breasts are heterogeneously dense, which may obscure\par small masses.\par \par Findings:\par \par Mammogram:\par Postsurgical architectural distortion is noted at the area of prior surgery\par in the lateral aspect of the left breast.\par Incidentally noted in the medial aspect of the left breast is an oval\par circumscribed hypodense mass measuring 0.8 cm. Short interval follow-up\par recommended. Otherwise ultrasound-guided biopsy pending results of\par stereotactic biopsy.\par Also incidentally noted are grouped heterogeneous calcifications in the\par upper outer quadrant of the left breast. Stereotactic biopsy recommended.\par \par Ultrasound:\par \par Targeted unilateral left breast ultrasound was performed.\par \par Left breast:\par At 10-11 o'clock N5 area of mammographic concern there is a hypoechoic oval\par circumscribed mass with thin septations measuring 0.8 x 0.6 x 0.3 cm favored\par to be cluster of microcysts. Short interval follow-up recommended. Otherwise\par ultrasound-guided biopsy pending results of stereotactic biopsy.\par \par Architectural distortion is noted at the area of previous surgery in the\par retroareolar region of the left breast. No new mass or microcalcifications\par is noted in this area of prior surgery.\par \par Impression:\par 1. Group of heterogeneous calcifications in the upper outer quadrant of the\par left breast. Stereotactic biopsy recommended.\par \par 2. Left breast 10-11:00 hypoechoic mass with thin septations favored to be\par cluster of microcysts. Short interval follow-up in 6 months with mammogram\par and ultrasound. Otherwise ultrasound guided biopsy pending the result of\par stereotactic biopsy.\par \par Recommendation: Stereotactic guided biopsy.\par \par BI-RADS Category 4: Suspicious\par \par The above findings and recommendations were discussed with the patient at\par the time of the examination.\par \par The above findings and recommendations were discussed with the doctor's\par office (JUWAN Gonzalez) on 6/19/2020 at 12:05 PM by Dr. Noyola via telephone.\par \par \par \par \par \par \par SANGEETA NOYOLA M.D., ATTENDING RADIOLOGIST\par This document has been electronically signed. Jun 19 2020 12:43PM\par \par EXAM: MG STEREO BX 1ST LT SISC\par \par *** ADDENDUM 07/03/2020 ***\par \par Postprocedure patient follow-up and Pathology result:\par \par -Diagnosis:\par BREAST, LEFT CALCIFICATIONS, STEREOTACTIC GUIDED VACUUM ASSISTED NEEDLE CORE\par BIOPSIES:\par - SMALL STELLATE SCLEROSING LESION, 2.0 MM (MICROSCOPIC\par MEASUREMENT), AT THE EDGE OF ONE BIOPSY TISSUE CORE ASSOCIATED WITH BOTH\par CALCIUM OXALATE CRYSTALS AND PHOSPHATE CALCIFICATIONS; SUGGESTIVE OF, BUT\par NOT DIAGNOSTICALLY DEFINITIVE FOR, A TINY RADIAL SCAR .\par - BENIGN BREAST TISSUE WITH PROLIFERATIVE TYPE FIBROCYSTIC\par CHANGES INCLUDING FLORID DUCT HYPERPLASIA, STROMAL FIBROSIS WITH FOCI OF\par PSEUDOANGIOMATOUS STROMAL HYPERPLASIA (PASH), SCLEROSING AND BLUNT DUCT\par ADENOSIS, COLUMNAR CELL CHANGE WITHOUT ATYPIA, APOCRINE METAPLASIA, AND BOTH\par STROMAL AND EPITHELIAL MICROCALCIFICATIONS.\par \par -The pathology results are concordant with the imaging findings.\par \par -Recommendation: Surgical consultation recommended.\par \par -No significant delayed complications.\par \par -Results were discussed with patient on 7/3/2020 at 1:50 PM by Dr. Noyola via\par telephone.\par \par \par \par \par *** END OF ADDENDUM 07/03/2020 ***\par \par \par \par PROCEDURE DATE: 06/30/2020\par \par \par \par INTERPRETATION:\par Clinical history: Left breast calcifications.\par \par Procedures: left breast stereotactic vacuum assisted core biopsy and post\par clip placement two view left breast mammogram.\par \par Previous films and reports were reviewed. The procedure, its risks,\par benefits, and alternatives were explained to the patient, who expressed\par understanding and gave informed written and verbal consent. A time-out,\par which included the patient's full name, date of birth, description of the\par expected procedure and procedure site, was performed immediately before the\par procedure.\par \par A superior approach was selected for the procedure. Using the usual sterile\par technique and stereotactic guidance, the previously described calcifications\par in the left breastfrom were biopsied using a 9 gauge vacuum-assisted device.\par A single pass was made and 12 samples were collected. Specimen radiography\par demonstrated the calcifications to be contained within the specimen. A\par ([employer]purse mini-cork) localizing clip was then placed into the biopsy cavity.\par Hemostasis was obtained and a sterile dressing was applied.\par \par A unilateral left mammogram performed in the CC and lateral projections\par demonstrates biopsy marker in appropriate position.\par \par The patient tolerated the procedure well and left the department in good\par condition. Written discharge instructions were discussed and provided to the\par patient.\par \par IMPRESSION:\par \par Successful stereotactic guided biopsy of the left breast.\par \par \par Pathology: Pending, to be reported as an addendum.\par \par ***Please see the addendum at the top of this report. It may contain\par additional important information or changes.****\par \par \par \par \par SANGEETA NOYOLA M.D., ATTENDING RADIOLOGIST\par This document has been electronically signed. Jun 30 2020 2:11PM\par Addend: SANGEETA NOYOLA M.D., ATTENDING RADIOLOGIST\par This addendum was electronically signed on: Jul 3 2020 1:55PM.\par

## 2020-07-20 NOTE — ASSESSMENT
[FreeTextEntry1] : Tianna is a 49 F with a left breast retroareolar intraductal papilloma, s/p L WLE on 11/19/19, now with left breast calcifications which the biopsy resulted in a lesion suggestive of but definitive for a radial scar.  \par \par 11/19/19 -- L WLE \par -no residual intraductal papilloma, benign breast tissue with proliferative type FC changes\par \par She has no breast related complaints.  Her most recent imaging was a L dx mammogram and US on 6/19/2020 which revealed new heterogenous calcifications in UOQ, which was biopsy proven stellate sclerosing lesion, suggestive of a radial scar, as well as a left breast mass measuring 8 x 6 x 3 mm, likely microcysts, deemed BIRADS 3.  \par \par She will be due for a b/l dx mammogram and US on 11/9/2020 (will delay her right screening mammogram for 2 months).  This will be scheduled for her today.  \par \par We discussed radial scars without atypia.  These lesions are considered fibroproliferative lesions without atypia.  Patients with these lesions were found to have a slightly increased relative risk compared to the reference population.  However the lesions themselves do not have any malignant potential. There is about a 10-20% upgrade rate to a high risk lesion (including atypical hyperplasias) and a <3% upgrade to cancer.  However, when atypia is present there is up to a 15-25% upgrade rate to cancer.  For this reason, we have discussed observation vs. surgical excision of this lesion.  At this time, she would like to observe this lesion.  I will have her follow up after her repeat imaging. \par \par Intraductal papillomas without atypia are considered fibroproliferative lesions without atypia.  Patients with these lesions were found to have a slightly increased relative risk of breast cancer compared to the reference population.  However the lesions themselves do not have any malignant potential. \par \par In regards to her family history of breast cancer, her risk assessment is as follows: \par \par RISK ASSESSMENT: \par Latonia\par 5yr -- 1.6%\par lifetime -- 14.4%\par \par TC v6\par 5yr -- 4.1%\par lifetime -- 16.5%\par \par This puts her at an intermediate risk for breast cancer.  She does not quite meet criteria for screening MRIs or chemoppx at this time.  IN regards to genetic testing, both her paternal aunt and grandmother were diagnosed at a young age.  If either of them were found to have a mutation, then we can test Tianna for that mutation.  This was discussed with her and she will let us know if she would like to proceed with genetic testing.  \par \par We discussed dense breasts.  Increasing breast density has been found to increase ones risk of breast cancer, but at this time, there is no clear indication for additional imaging in this setting, as both US and MRI have not been found to improve survival.  One can consider bilateral screening US.  However, out of 1000 women screened, the use of routine US will only identify an additional 3-5 cancers.  The use of US was found to increase the likelihood of undergoing more imaging and more biopsies.  She does have dense breasts.  We have decided to proceed with screening bilateral breast US at this time.  This will be scheduled with her next screening mammogram.\par \par All of her questions were answered.  She knows to call with any further questions or concerns. \par \par PLAN: \par -b/l dx mammogram and US On 11/9/2020\par -f/up after repeat imaging

## 2020-09-25 ENCOUNTER — APPOINTMENT (OUTPATIENT)
Dept: BREAST CENTER | Facility: CLINIC | Age: 50
End: 2020-09-25

## 2020-11-13 ENCOUNTER — RESULT REVIEW (OUTPATIENT)
Age: 50
End: 2020-11-13

## 2020-11-13 ENCOUNTER — OUTPATIENT (OUTPATIENT)
Dept: OUTPATIENT SERVICES | Facility: HOSPITAL | Age: 50
LOS: 1 days | Discharge: HOME | End: 2020-11-13
Payer: COMMERCIAL

## 2020-11-13 DIAGNOSIS — Z90.711 ACQUIRED ABSENCE OF UTERUS WITH REMAINING CERVICAL STUMP: Chronic | ICD-10-CM

## 2020-11-13 DIAGNOSIS — R92.8 OTHER ABNORMAL AND INCONCLUSIVE FINDINGS ON DIAGNOSTIC IMAGING OF BREAST: ICD-10-CM

## 2020-11-13 PROCEDURE — 76641 ULTRASOUND BREAST COMPLETE: CPT | Mod: 26,50

## 2020-11-13 PROCEDURE — G0279: CPT | Mod: 26

## 2020-11-13 PROCEDURE — 77066 DX MAMMO INCL CAD BI: CPT | Mod: 26

## 2020-11-20 ENCOUNTER — APPOINTMENT (OUTPATIENT)
Dept: BREAST CENTER | Facility: CLINIC | Age: 50
End: 2020-11-20
Payer: COMMERCIAL

## 2020-11-20 VITALS
SYSTOLIC BLOOD PRESSURE: 124 MMHG | BODY MASS INDEX: 38.57 KG/M2 | DIASTOLIC BLOOD PRESSURE: 80 MMHG | WEIGHT: 240 LBS | TEMPERATURE: 97.6 F | HEIGHT: 66 IN

## 2020-11-20 PROCEDURE — 99213 OFFICE O/P EST LOW 20 MIN: CPT

## 2020-11-20 NOTE — HISTORY OF PRESENT ILLNESS
[FreeTextEntry1] : Tianna is a 49 F with a screen detected left breast intraductal papilloma s/p L WLE on 19, now with a left breast lesion suggestive of a radial scar, being managed conservatively.\par \par 19 -- L WLE \par -benign retroareolar breast tissue with proliferative type fibrocystic changes, healing prior biopsy site changes with no residual intraductal papilloma identified\par \par Her work up was as follows: \par 19 -- b/l screening tomosynthesis \par -heterogenously dense breasts \par -apparent mass with circumscribe margins in retroareolar L breast \par -no suspicious mass, calcifications or other abnormality in R breast \par BIRADS 0 \par \par 19 -- L dx mammogram and US \par -heterogenously dense breasts \par -oval circumscribed mass in RA L, measuring 0.8 cm \par L US \par -RA, oval circumscribed hypoechoic mass, measuring 0.7 x 0.7 x 0.5 cm\par BIRADS 4 \par \par 19 -- L US CNBx \par -intraductal papilloma \par \par She has no breast related complaints at this time.  She denies any breast pain, has not palpated any new palpable masses in either breast and denies any nipple discharge or retraction.  \par \par HISTORICAL RISK FACTORS:\par -1 prior breast biopsy as above \par -family history of breast cancer in her paternal grandmother and aunt, at ages 50 and 48 respectively, unknown if they got genetic testing \par -, age at first live birth was 33 \par -prior OCP use x 15 years, stopped in ; currently on HRT x 1 year \par -s/p partial hysterectomy in  for uncontrolled vaginal bleeding \par \par RISK ASSESSMENT: \par Latonia\par 5yr -- 1.6%\par lifetime -- 14.4%\par \par TC v6\par 5yr -- 4.1%\par lifetime -- 16.5%\par \par INTERVAL HISTORY: \par Tianna is a 49 F with a left breast intraductal papilloma, s/p L WLE on 19. \par She returns today for a 6 month follow up for a L breast radial scar, being managed conservatively. \par \par Her most recent imaging was a b/l dx mammogram and US on 2020 which revealed no new calcifications at the site of her prior radial scar and no residual calcifications in that area, as well as a cyst with debris @10-11N5, measuring 7 x 5 x 3 mm deemed BIRADS 3. \par \par AS REVIEW: \par Her work up was as follows: \par 2020 --L dx mammogram \par -heterogenously dense breasts\par -post surgical distortion in lateral breast \par -in medial breast, circumscribed hypodense mass measuring 0.8 cm --> BIRADS 3 \par -new heterogenous calcifications in UOQ, L --> BIOPSY \par BIRADS 3 \par \par 7/3/2020 -- L stereotactic guided biopsy \par -small stellate sclerosing lesion, 2 mm, at the edge of core biopsy, calcifications suggestive of but not diagnostically definitive for a tiny radial scar \par \par She has no breast related complaints at this time.  She denies any breast pain, has not palpated any new palpable masses in either breast and denies any nipple discharge or retraction.

## 2020-11-20 NOTE — DATA REVIEWED
[FreeTextEntry1] : EXAM: MG US BREAST COMPLETE BI\par EXAM: MG MAMMO DIAG W SEGUNDO BI#\par \par \par PROCEDURE DATE: 11/13/2020\par \par \par \par INTERPRETATION: Clinical History / Reason for exam: Follow-up surgical excision of left breast intraductal papilloma and subsequently biopsied radial scar.\par \par The patient reports her last clinical breast examination was performed about 3 month ago.\par \par Family history: paternal grandmother, at age 50, breast cancer and paternal aunt, breast cancer.\par \par Comparisons: Mammograms dating back 2017.\par \par Views obtained: Bilateral CC and MLO views were obtained. 3D tomosynthesis images were obtained as well.\par \par Computer-aided detection was utilized in the interpretation of this examination.\par \par Breast composition: The breasts are heterogeneously dense, which may obscure small masses.\par \par Findings:\par \par Mammogram:\par Postsurgical architectural distortion is noted at the area of prior surgery in the lateral aspect of the left breast.\par \par Ovoid nodule in the medial aspect of the left breast is stable at least since 2014.\par \par Previously noted heterogeneous calcifications in the left upper outer quadrant are no longer seen. A biopsy clip is noted instead. No new calcifications are identified.\par \par No suspicious masses or calcifications are identified in the right breast.\par \par \par Ultrasound:\par \par Bilateral whole breast ultrasound was performed.\par \par Left breast:\par At 10-11 o'clock N5 there is a slightly smaller cyst with debris measuring 7 x 5 x 3 mm. No other solid or cystic lesions are identified bilaterally.\par \par Impression:\par Biopsy clip in the left upper outer quadrant corresponds to the previously biopsied radial scar. Surgical consultation is recommended.\par \par Left breast 10-11:00 cyst with debris is probably benign. Follow-up sonogram in 6 months is recommended.\par \par Recommendation: Surgical consultation for known radial scar. Follow-up ultrasound of the left breast in 6 months.\par \par Recommendation: Any decision to biopsy the palpable abnormality should be based on the level of clinical concern.\par \par BI-RADS Category 2: Benign\par \par The above findings and recommendations were discussed with the patient at\par the time of the examination.\par \par \par \par \par \par \par \par \par LUIS ANTONIO OLIVERA MD; Attending Radiologist\par This document has been electronically signed. Nov 13 2020 12:56PM

## 2020-11-20 NOTE — PHYSICAL EXAM
[Normocephalic] : normocephalic [Atraumatic] : atraumatic [EOMI] : extra ocular movement intact [No Supraclavicular Adenopathy] : no supraclavicular adenopathy [No Cervical Adenopathy] : no cervical adenopathy [Examined in the supine and seated position] : examined in the supine and seated position [Symmetrical] : symmetrical [No dominant masses] : no dominant masses in right breast  [No dominant masses] : no dominant masses left breast [No Nipple Retraction] : no left nipple retraction [No Nipple Discharge] : no left nipple discharge [No Axillary Lymphadenopathy] : no left axillary lymphadenopathy [Soft] : abdomen soft [Not Tender] : non-tender [No Edema] : no edema [No Rashes] : no rashes [No Ulceration] : no ulceration [de-identified] : no suspicious masses palpated in either breast  [de-identified] : surgical incision is well healed

## 2020-11-20 NOTE — ASSESSMENT
[FreeTextEntry1] : Tianna is a 49 F with a left breast retroareolar intraductal papilloma, s/p L WLE on 11/19/19, now with left breast calcifications which the biopsy resulted in a lesion suggestive of but definitive for a radial scar.  \par \par 11/19/19 -- L WLE \par -no residual intraductal papilloma, benign breast tissue with proliferative type FC changes\par \par She has no breast related complaints.  \par On exam, I was not able to palpate any suspicious abnormalities within either breast. \par \par Her most recent imaging was a b/l dx mammogram and US on 11/13/2020 which revealed no new calcifications at the site of her prior radial scar and no residual calcifications in that area, as well as a cyst with debris @10-11N5, measuring 7 x 5 x 3 mm deemed BIRADS 3. \par \par She will be due for a L dx mammogram and US on 5/13/2021.  This will be scheduled for her today.  I will have her follow up after for a CBE. \par \par We will continue to monitor her L breast radial scar conservatively.\par \par We discussed radial scars without atypia.  These lesions are considered fibroproliferative lesions without atypia.  Patients with these lesions were found to have a slightly increased relative risk compared to the reference population.  However the lesions themselves do not have any malignant potential. There is about a 10-20% upgrade rate to a high risk lesion (including atypical hyperplasias) and a <3% upgrade to cancer.  However, when atypia is present there is up to a 15-25% upgrade rate to cancer.  For this reason, we have discussed observation vs. surgical excision of this lesion.  At this time, she would like to observe this lesion.  I will have her follow up after her repeat imaging. \par \par Intraductal papillomas without atypia are considered fibroproliferative lesions without atypia.  Patients with these lesions were found to have a slightly increased relative risk of breast cancer compared to the reference population.  However the lesions themselves do not have any malignant potential. \par \par In regards to her family history of breast cancer, her risk assessment is as follows: \par \par RISK ASSESSMENT: \par Latonia\par 5yr -- 1.6%\par lifetime -- 14.4%\par \par TC v6\par 5yr -- 4.1%\par lifetime -- 16.5%\par \par This puts her at an intermediate risk for breast cancer.  She does not quite meet criteria for screening MRIs or chemoppx at this time.  IN regards to genetic testing, both her paternal aunt and grandmother were diagnosed at a young age.  If either of them were found to have a mutation, then we can test Tianna for that mutation.  This was discussed with her and she will let us know if she would like to proceed with genetic testing.  \par \par We discussed dense breasts.  Increasing breast density has been found to increase ones risk of breast cancer, but at this time, there is no clear indication for additional imaging in this setting, as both US and MRI have not been found to improve survival.  One can consider bilateral screening US.  However, out of 1000 women screened, the use of routine US will only identify an additional 3-5 cancers.  The use of US was found to increase the likelihood of undergoing more imaging and more biopsies.  She does have dense breasts.  We have decided to proceed with screening bilateral breast US at this time.  This will be scheduled with her next screening mammogram.\par \par All of her questions were answered.  She knows to call with any further questions or concerns. \par \par PLAN: \par -L dx mammogram and US On 5/13/2021\par -f/up after repeat imaging

## 2020-11-20 NOTE — REVIEW OF SYSTEMS
[As Noted in HPI] : as noted in HPI [Negative] : Heme/Lymph [Fever] : no fever [Chills] : no chills [Abn Vaginal Bleeding] : no unexplained vaginal bleeding [Skin Lesions] : no skin lesions [Skin Wound] : no skin wound [Breast Pain] : no breast pain [Breast Lump] : no breast lump [Hot Flashes] : no hot flashes [de-identified] : mood swings, currently on HRT

## 2021-07-09 ENCOUNTER — RESULT REVIEW (OUTPATIENT)
Age: 51
End: 2021-07-09

## 2021-07-09 ENCOUNTER — OUTPATIENT (OUTPATIENT)
Dept: OUTPATIENT SERVICES | Facility: HOSPITAL | Age: 51
LOS: 1 days | Discharge: HOME | End: 2021-07-09
Payer: COMMERCIAL

## 2021-07-09 DIAGNOSIS — R92.8 OTHER ABNORMAL AND INCONCLUSIVE FINDINGS ON DIAGNOSTIC IMAGING OF BREAST: ICD-10-CM

## 2021-07-09 DIAGNOSIS — Z90.711 ACQUIRED ABSENCE OF UTERUS WITH REMAINING CERVICAL STUMP: Chronic | ICD-10-CM

## 2021-07-09 PROCEDURE — 76642 ULTRASOUND BREAST LIMITED: CPT | Mod: 26,LT

## 2021-07-09 PROCEDURE — 77065 DX MAMMO INCL CAD UNI: CPT | Mod: 26,LT

## 2021-07-09 PROCEDURE — G0279: CPT | Mod: 26

## 2021-07-12 ENCOUNTER — NON-APPOINTMENT (OUTPATIENT)
Age: 51
End: 2021-07-12

## 2021-07-23 ENCOUNTER — APPOINTMENT (OUTPATIENT)
Dept: BREAST CENTER | Facility: CLINIC | Age: 51
End: 2021-07-23

## 2021-11-19 ENCOUNTER — RESULT REVIEW (OUTPATIENT)
Age: 51
End: 2021-11-19

## 2021-11-19 ENCOUNTER — OUTPATIENT (OUTPATIENT)
Dept: OUTPATIENT SERVICES | Facility: HOSPITAL | Age: 51
LOS: 1 days | Discharge: HOME | End: 2021-11-19
Payer: COMMERCIAL

## 2021-11-19 DIAGNOSIS — R92.8 OTHER ABNORMAL AND INCONCLUSIVE FINDINGS ON DIAGNOSTIC IMAGING OF BREAST: ICD-10-CM

## 2021-11-19 DIAGNOSIS — Z90.711 ACQUIRED ABSENCE OF UTERUS WITH REMAINING CERVICAL STUMP: Chronic | ICD-10-CM

## 2021-11-19 PROCEDURE — G0279: CPT | Mod: 26

## 2021-11-19 PROCEDURE — 77066 DX MAMMO INCL CAD BI: CPT | Mod: 26

## 2021-11-19 PROCEDURE — 76642 ULTRASOUND BREAST LIMITED: CPT | Mod: 26,LT

## 2021-12-03 ENCOUNTER — RESULT REVIEW (OUTPATIENT)
Age: 51
End: 2021-12-03

## 2021-12-03 ENCOUNTER — OUTPATIENT (OUTPATIENT)
Dept: OUTPATIENT SERVICES | Facility: HOSPITAL | Age: 51
LOS: 1 days | Discharge: HOME | End: 2021-12-03
Payer: COMMERCIAL

## 2021-12-03 DIAGNOSIS — Z90.711 ACQUIRED ABSENCE OF UTERUS WITH REMAINING CERVICAL STUMP: Chronic | ICD-10-CM

## 2021-12-03 PROCEDURE — 88305 TISSUE EXAM BY PATHOLOGIST: CPT | Mod: 26

## 2021-12-03 PROCEDURE — 19081 BX BREAST 1ST LESION STRTCTC: CPT | Mod: RT

## 2021-12-03 PROCEDURE — 76098 X-RAY EXAM SURGICAL SPECIMEN: CPT | Mod: 26

## 2021-12-06 ENCOUNTER — NON-APPOINTMENT (OUTPATIENT)
Age: 51
End: 2021-12-06

## 2021-12-06 LAB — SURGICAL PATHOLOGY STUDY: SIGNIFICANT CHANGE UP

## 2021-12-09 DIAGNOSIS — N63.10 UNSPECIFIED LUMP IN THE RIGHT BREAST, UNSPECIFIED QUADRANT: ICD-10-CM

## 2022-02-08 ENCOUNTER — NON-APPOINTMENT (OUTPATIENT)
Age: 52
End: 2022-02-08

## 2022-02-08 ENCOUNTER — APPOINTMENT (OUTPATIENT)
Dept: BREAST CENTER | Facility: CLINIC | Age: 52
End: 2022-02-08
Payer: COMMERCIAL

## 2022-02-08 VITALS
TEMPERATURE: 97.3 F | DIASTOLIC BLOOD PRESSURE: 87 MMHG | SYSTOLIC BLOOD PRESSURE: 136 MMHG | BODY MASS INDEX: 38.57 KG/M2 | WEIGHT: 240 LBS | HEIGHT: 66 IN

## 2022-02-08 DIAGNOSIS — R92.8 OTHER ABNORMAL AND INCONCLUSIVE FINDINGS ON DIAGNOSTIC IMAGING OF BREAST: ICD-10-CM

## 2022-02-08 DIAGNOSIS — D24.2 BENIGN NEOPLASM OF LEFT BREAST: ICD-10-CM

## 2022-02-08 DIAGNOSIS — N64.89 OTHER SPECIFIED DISORDERS OF BREAST: ICD-10-CM

## 2022-02-08 PROCEDURE — 99213 OFFICE O/P EST LOW 20 MIN: CPT

## 2022-02-08 NOTE — PHYSICAL EXAM
[Normocephalic] : normocephalic [Atraumatic] : atraumatic [EOMI] : extra ocular movement intact [No Supraclavicular Adenopathy] : no supraclavicular adenopathy [No Cervical Adenopathy] : no cervical adenopathy [Examined in the supine and seated position] : examined in the supine and seated position [Symmetrical] : symmetrical [No dominant masses] : no dominant masses in right breast  [No dominant masses] : no dominant masses left breast [No Nipple Retraction] : no left nipple retraction [No Nipple Discharge] : no left nipple discharge [No Axillary Lymphadenopathy] : no left axillary lymphadenopathy [Soft] : abdomen soft [Not Tender] : non-tender [No Edema] : no edema [No Rashes] : no rashes [No Ulceration] : no ulceration [de-identified] : no suspicious masses palpated in either breast  [de-identified] : surgical incision is well healed

## 2022-02-08 NOTE — PAST MEDICAL HISTORY
[FreeTextEntry5] : s/p partial hysterectomy on 2012 [FreeTextEntry6] : denies [FreeTextEntry7] : yes in past x 15 years, stopped in 2003  [FreeTextEntry8] : denies

## 2022-02-08 NOTE — HISTORY OF PRESENT ILLNESS
[FreeTextEntry1] : Tianna is a 49 F with a screen detected left breast intraductal papilloma s/p L WLE on 19, now with a left breast lesion suggestive of a radial scar, being managed conservatively.\par \par 19 -- L WLE \par -benign retroareolar breast tissue with proliferative type fibrocystic changes, healing prior biopsy site changes with no residual intraductal papilloma identified\par \par Her work up was as follows: \par 19 -- b/l screening tomosynthesis \par -heterogenously dense breasts \par -apparent mass with circumscribe margins in retroareolar L breast \par -no suspicious mass, calcifications or other abnormality in R breast \par BIRADS 0 \par \par 19 -- L dx mammogram and US \par -heterogenously dense breasts \par -oval circumscribed mass in RA L, measuring 0.8 cm \par L US \par -RA, oval circumscribed hypoechoic mass, measuring 0.7 x 0.7 x 0.5 cm\par BIRADS 4 \par \par 19 -- L US CNBx \par -intraductal papilloma \par \par She has no breast related complaints at this time.  She denies any breast pain, has not palpated any new palpable masses in either breast and denies any nipple discharge or retraction.  \par \par HISTORICAL RISK FACTORS:\par -1 prior breast biopsy as above \par -family history of breast cancer in her paternal grandmother and aunt, at ages 50 and 48 respectively, unknown if they got genetic testing \par -, age at first live birth was 33 \par -prior OCP use x 15 years, stopped in ; currently on HRT x 1 year \par -s/p partial hysterectomy in  for uncontrolled vaginal bleeding \par \par RISK ASSESSMENT: \par Latonia\par 5yr -- 1.6%\par lifetime -- 14.4%\par \par TC v6\par 5yr -- 4.1%\par lifetime -- 16.5%\par \par INTERVAL HISTORY: \par Tianna is a 49 F with a left breast intraductal papilloma, s/p L WLE on 19. \par She returns today for a 6 month follow up for a L breast radial scar, being managed conservatively. \par \par Her most recent imaging was a b/l dx mammogram and US on 2020 which revealed no new calcifications at the site of her prior radial scar and no residual calcifications in that area, as well as a cyst with debris @10-11N5, measuring 7 x 5 x 3 mm deemed BIRADS 3. \par \par AS REVIEW: \par Her work up was as follows: \par 2020 --L dx mammogram \par -heterogenously dense breasts\par -post surgical distortion in lateral breast \par -in medial breast, circumscribed hypodense mass measuring 0.8 cm --> BIRADS 3 \par -new heterogenous calcifications in UOQ, L --> BIOPSY \par BIRADS 3 \par \par 7/3/2020 -- L stereotactic guided biopsy \par -small stellate sclerosing lesion, 2 mm, at the edge of core biopsy, calcifications suggestive of but not diagnostically definitive for a tiny radial scar \par \par She has no breast related complaints at this time.  She denies any breast pain, has not palpated any new palpable masses in either breast and denies any nipple discharge or retraction. \par \par \par INTERVAL HISTORY:\par 2022 --\par Tianna is 51 year old female here for follow-up for h/o left breast intraductal papilloma (2019); un excised left breast radial scar; s/p recent benign right breast stereo bx.\par \par Her most recent work-up is as follows:\par 2021 - B/L Dx Mammo and Left US:\par - breasts are heterogeneously dense\par - grouped heterogeneous calcifications in the lateral right breast-->Stereotactic biopsy\par - redemonstration of area of biopsy-proven radial scar in the left breast--> Continue surgical consultation \par LEFT US:\par -@10-11 N5 prior noted mass  is not visualized on current study likely on the basis of fluctuating cyst.\par BI-RADS 4\par \par 22 - Stereotactic Bx:\par Right, lateral Ca++: (top-hat)\par - Benign early atrophic breast tissue with proliferative type fibrocystic changes including usual type duct hyperplasia, stromal fibrosis with foci of PASH, sclerosing adenosis, columnar cell change/hyperplasia without atypia, and numerous microcalcifications.\par

## 2022-02-08 NOTE — REASON FOR VISIT
[FreeTextEntry1] : h/o left breast intraductal papilloma (2019); un excised left breast radial scar; s/p recent benign right breast stereo bx.

## 2022-02-08 NOTE — ASSESSMENT
[FreeTextEntry1] : Tianna is a 51 F with a left breast retroareolar intraductal papilloma, s/p L WLE on 11/19/19, un excised left breast radial scar; s/p recent benign right breast stereo bx.\par \par 11/19/19 -- L WLE \par -no residual intraductal papilloma, benign breast tissue with proliferative type FC changes\par \par She has no breast related complaints.  \par On exam, I was not able to palpate any suspicious abnormalities within either breast. \par \par Her most recent work-up is as follows:\par 11/19/2021 - B/L Dx Mammo and Left US:\par - breasts are heterogeneously dense\par - grouped heterogeneous calcifications in the lateral right breast-->Stereotactic biopsy\par - redemonstration of area of biopsy-proven radial scar in the left breast--> Continue surgical consultation \par LEFT US:\par -@10-11 N5 prior noted mass  is not visualized on current study likely on the basis of fluctuating cyst.\par BI-RADS 4\par \par 01/03/22 - Stereotactic Bx:\par Right, lateral Ca++: (top-hat)\par - Benign early atrophic breast tissue with proliferative type fibrocystic changes including usual type duct hyperplasia, stromal fibrosis with foci of PASH, sclerosing adenosis, columnar cell change/hyperplasia without atypia, and numerous microcalcifications.\par \par She will be sent for a b/l dx mammogram and left targeted US in June 2022.  This will be scheduled for her today.  \par I will have her follow up after for a CBE. \par \par We will continue to monitor her L breast radial scar conservatively.\par \par We discussed radial scars without atypia.  These lesions are considered fibroproliferative lesions without atypia.  Patients with these lesions were found to have a slightly increased relative risk compared to the reference population.  However the lesions themselves do not have any malignant potential. There is about a 10-20% upgrade rate to a high risk lesion (including atypical hyperplasias) and a <3% upgrade to cancer.  However, when atypia is present there is up to a 15-25% upgrade rate to cancer.  For this reason, we have discussed observation vs. surgical excision of this lesion.  At this time, she would like to observe this lesion.  I will have her follow up after her repeat imaging. \par \par Intraductal papillomas without atypia are considered fibroproliferative lesions without atypia.  Patients with these lesions were found to have a slightly increased relative risk of breast cancer compared to the reference population.  However the lesions themselves do not have any malignant potential. \par \par In regards to her family history of breast cancer, her risk assessment is as follows: \par \par RISK ASSESSMENT: \par Latonia\par 5yr -- 1.6%\par lifetime -- 14.4%\par \par TC v6\par 5yr -- 4.1%\par lifetime -- 16.5%\par \par This puts her at an intermediate risk for breast cancer.  She does not quite meet criteria for screening MRIs or chemoppx at this time.  IN regards to genetic testing, both her paternal aunt and grandmother were diagnosed at a young age.  If either of them were found to have a mutation, then we can test Tianna for that mutation.  This was discussed with her and she will let us know if she would like to proceed with genetic testing.  \par \par We discussed dense breasts.  Increasing breast density has been found to increase ones risk of breast cancer, but at this time, there is no clear indication for additional imaging in this setting, as both US and MRI have not been found to improve survival.  One can consider bilateral screening US.  However, out of 1000 women screened, the use of routine US will only identify an additional 3-5 cancers.  The use of US was found to increase the likelihood of undergoing more imaging and more biopsies.  She does have dense breasts.  We have decided to proceed with screening bilateral breast US at this time.  This will be scheduled with her next screening mammogram.\par \par All of her questions were answered.  She knows to call with any further questions or concerns. \par \par PLAN: \par -B/L Dx Mammo & targeted Left Sono - June 2022.\par -f/up after repeat imaging

## 2022-02-08 NOTE — DATA REVIEWED
[FreeTextEntry1] : EXAM:  MG US BREAST LIMITED LT\par EXAM:  MG MAMMO DIAG W SEGUNDO BI#\par \par \par PROCEDURE DATE:  11/19/2021\par \par \par \par INTERPRETATION:  Clinical History / Reason for exam: Follow-up breast breast mass. Screening right breast.\par \par The patient reports last clinical breast examination was performed about: six months ago.\par \par Family history of breast cancer: paternal aunt, breast cancer and paternal grandmother, at age 50, breast cancer.\par \par Comparisons: Mammograms dating back to 2018. Breast ultrasound dating back to 2019.\par \par Views obtained: bilateral full field digital 2D and digital tomosynthesis images as well as magnification views.\par \par Computer-aided detection was utilized in the interpretation of this examination.\par \par Breast composition: The breasts are heterogeneously dense, which may obscure small masses.\par \par Findings:\par \par Mammogram:\par There are grouped heterogeneous calcifications in the lateral right breast. Stereotactic biopsy recommended.\par There is redemonstration of area of biopsy-proven radial scar in the left breast. Continue surgical consultation recommended. Postsurgical architectural distortion is noted in the left breast.\par \par Ultrasound:\par \par Targeted unilateral left breast ultrasound was performed.\par \par The prior noted mass at left breast 10-11 o'clock N5 is not visualized on current study likely on the basis of fluctuating cyst.\par \par Impression:\par 1. Group heterogeneous calcifications in the right breast as above. Stereotactic biopsy recommended.\par 2. Redemonstration of area of biopsy-proven radial scar in the left breast. Continue surgical surgical consultation recommended.\par \par Recommendation: Stereotactic guided biopsy.\par \par BI-RADS Category 4: Suspicious\par EXAM:  MG STEREO BX 1ST RT SISC\par \par *** ADDENDUM 12/06/2021  ***\par \par FINAL DIAGNOSIS\par BREAST, RIGHT LATERAL CALCIFICATIONS, STEREOTACTIC GUIDED VACUUM ASSISTED NEEDLE CORE BIOPSIES:\par \par - BENIGN EARLY ATROPHIC BREAST TISSUE WITH PROLIFERATIVE TYPE FIBROCYSTIC CHANGES INCLUDING USUAL TYPE DUCT HYPERPLASIA, STROMAL FIBROSIS WITH FOCI OF PSEUDOANGIOMATOUS STROMAL HYPERPLASIA (PASH), SCLEROSING ADENOSIS, COLUMNAR CELL CHANGE/HYPERPLASIA WITHOUT ATYPIA, AND NUMEROUS\par MICROCALCIFICATIONS.\par \par Findings are benign concordant.\par \par Recommendations: Continued surgical management of biopsy-proven radial scar in the left breast is recommended as per prior report.\par \par The above findings and recommendations were discussed with the patient on 12/7/2021.\par \par --- End of Report ---\par \par *** END OF ADDENDUM 12/06/2021  ***\par \par \par \par PROCEDURE DATE:  12/03/2021\par \par \par \par INTERPRETATION:  Clinical History / Reason for exam: Indeterminate right breast calcifications\par \par Images and reports were reviewed. Consent was obtained following a discussion of risks and benefits of the procedure as well as questioning about patient allergies. The patient safety checklist, including time out procedure, was used.\par \par The right breast was compressed and stereo and tomographic images were obtained to locate the calcifications. The calcifications were located in the upper outer quadrant of the breast. A superior approach was used. The area was prepped and draped in the normal sterile fashion.  The skin was anesthetized with 5 mL buffered 1% lidocaine. A small skin incision was made.  Through the incision, using a biopsy gun, a 9 gauge needle was introduced and pre-and post-fire stereo images were obtained. Deep anesthesia was given with 10 mL 1% lidocaine with epinephrine. Numerous specimens were taken. The specimens were x-rayed and several of them contain microcalcifications.\par \par Through the needle a radiopaque marker (Tandem top-hat) was deposited.  The needle was removed and the breast was compressed to achieve hemostasis. The patient tolerated the procedure well and there was no immediate post procedure complication.\par \par The specimens were  into those that contain calcifications and those that do not contain calcifications and were put in different containers and sent to pathology.\par \par Post procedure two view mammogram demonstrates the marking clip to be in good position.\par \par IMPRESSION:\par \par Successful stereotactic core biopsy of microcalcifications in the right breast.\par \par Histology: Pending, to be reported in an addendum.\par \par

## 2022-06-28 ENCOUNTER — APPOINTMENT (OUTPATIENT)
Dept: BREAST CENTER | Facility: CLINIC | Age: 52
End: 2022-06-28

## 2022-08-25 NOTE — DATA REVIEWED
[FreeTextEntry1] : Carlos Accession Number : [id number]\par \par TEAGAN ASHLEY\par \par \par \par Surgical Final Report\par \par \par \par \par Final Diagnosis\par Breast, left retroareolar mass, radio frequency seed localized\par lumpectomy:\par - Benign retroareolar breast tissue with proliferative type\par fibrocystic changes including usual type duct hyperplasia,\par stromal fibrosis, sclerosing adenosis, and apocrine metaplasia.\par - Focal mammary duct ectasia.\par - Healing prior biopsy site changes with no residual intraductal\par papillary lesion identified in this entirely submitted specimen\par (see comment).\par \par Comment: The specimen's surgical margins demonstrate focal marked\par electrocautery thermal artifact thus\par rendering optimal histopathologic evaluation of these areas\par difficult.\par \par Verified by: Doug Gambino M.D.\par (Electronic Signature)\par Reported on: 11/21/19 13:48 EST, [facility] [address]\par Phone: [phone]   Fax: [phone]\par _________________________________________________________________\par \par Clinical History\par Left breast wide local excision with radio frequency localizer\par \par Specimen(s) Submitted\par Left breast mass lumpectomy\par \par Gross Description\par The specimen is received fresh, labeled "left breast mass with\par radiofrequency localizer, single stitch-anterior, double-lateral,\par triple-superior" and consists of a breast mass measuring 3 x 2.5\par x 1.5 cm and weighing 8 grams. The specimen is inked as follows:\par superior surface-blue, inferior-green, lateral-yellow, medial-\par orange, anterior-red, posterior-black. Serial sections reveal\par white to tan cut surface. Clip is found in the center of the\par mass. The specimen is submitted entirely. (6 blocks) (clips 1D,\par 1E)\par \par Specimen was received and underwent gross examination at [facility], [address] [place] [address],\par [place] [address].\par \par 11/20/19 10:27 mt\par \par Perioperative Diagnosis\par Left breast intraductal papilloma\par \par  \par  Detail Level: Simple Detail Level: Zone Detail Level: Detailed

## 2023-04-21 ENCOUNTER — OUTPATIENT (OUTPATIENT)
Dept: OUTPATIENT SERVICES | Facility: HOSPITAL | Age: 53
LOS: 1 days | End: 2023-04-21

## 2023-04-21 DIAGNOSIS — Z90.711 ACQUIRED ABSENCE OF UTERUS WITH REMAINING CERVICAL STUMP: Chronic | ICD-10-CM

## 2023-04-21 DIAGNOSIS — R92.8 OTHER ABNORMAL AND INCONCLUSIVE FINDINGS ON DIAGNOSTIC IMAGING OF BREAST: ICD-10-CM

## 2023-04-22 DIAGNOSIS — R92.8 OTHER ABNORMAL AND INCONCLUSIVE FINDINGS ON DIAGNOSTIC IMAGING OF BREAST: ICD-10-CM
